# Patient Record
Sex: FEMALE | Race: WHITE | NOT HISPANIC OR LATINO | Employment: FULL TIME | URBAN - METROPOLITAN AREA
[De-identification: names, ages, dates, MRNs, and addresses within clinical notes are randomized per-mention and may not be internally consistent; named-entity substitution may affect disease eponyms.]

---

## 2023-09-05 ENCOUNTER — APPOINTMENT (OUTPATIENT)
Dept: RADIOLOGY | Facility: CLINIC | Age: 28
End: 2023-09-05
Payer: OTHER GOVERNMENT

## 2023-09-05 DIAGNOSIS — M25.551 RIGHT HIP PAIN: ICD-10-CM

## 2023-09-05 PROCEDURE — 73502 X-RAY EXAM HIP UNI 2-3 VIEWS: CPT

## 2023-09-18 ENCOUNTER — TELEPHONE (OUTPATIENT)
Age: 28
End: 2023-09-18

## 2023-09-18 NOTE — TELEPHONE ENCOUNTER
Caller: patient     Doctor: Kd Edwards    Reason for call: office must contact [facility] for copy of disk/imaging. Appt is scheduled 9/19.     Patient didn't have the ph #     Call back#: [phone]

## 2023-09-19 NOTE — TELEPHONE ENCOUNTER
Caller: Self    Doctor: Gildardo Sauer    Reason for call: Patient was notified by Lurdes Williamson that the office must request imaging from the [employer]. Is it beneficial for patient to be seen today without the imaging or should she wait until office requests them?     Confirmed with office, she should still come in to see provider and to sign medical release form-patient aware    Call back#: [phone]

## 2023-10-10 ENCOUNTER — TELEPHONE (OUTPATIENT)
Age: 28
End: 2023-10-10

## 2023-10-10 NOTE — TELEPHONE ENCOUNTER
Caller: Patient    Doctor: Edgar Zapata    Reason for call: Patient asked if medical request form was sent to [facility] to get MRI from [facility]?   Please contact to update    Call back#: [phone]

## 2023-10-11 NOTE — TELEPHONE ENCOUNTER
Caller: Self    Doctor: Al Daigle    Reason for call: Patient will be in tomorrow to complete medical record release form    Call back#: [phone]

## 2023-10-12 ENCOUNTER — TELEPHONE (OUTPATIENT)
Age: 28
End: 2023-10-12

## 2023-10-12 NOTE — TELEPHONE ENCOUNTER
Patient came in and signed a new Medical Information Release Form which was faxed to the number provided by the patient.

## 2023-10-12 NOTE — TELEPHONE ENCOUNTER
Caller: Farhana levi [facility]    Doctor: Dr. Fuller Handler    Reason for call: Unable to send medical records electronically, she will fax to  [phone] and mail to [place] office   Call back#: n/a

## 2023-10-23 ENCOUNTER — TELEPHONE (OUTPATIENT)
Dept: OBGYN CLINIC | Facility: HOSPITAL | Age: 28
End: 2023-10-23

## 2023-10-23 NOTE — TELEPHONE ENCOUNTER
Caller: Kaiden Preston     Doctor: Tiffanie Banda / Gato     Reason for call: Patient called in after receiving message regarding MRI and imaging results. Patient was told the imaging was in the chart when she made the appointment and noted in her appointment line. Checking media tab. First scanned item on 10/12 / MRI R hip completed on 7/11/2023. The following additional scanned items are xray.      Informed patient her MRI is in the system for appointment tomorrow with Dr. Tiffanie Banda       Call back#:

## 2023-10-23 NOTE — TELEPHONE ENCOUNTER
Called patient to let her know that MRI images where not received at office. Only MRI report is scanned in chart. LM on  for patient to C/B to r/s appt on 10/24. MRI images must be available to be seen at office.

## 2023-10-24 ENCOUNTER — TELEPHONE (OUTPATIENT)
Dept: OBGYN CLINIC | Facility: CLINIC | Age: 28
End: 2023-10-24

## 2023-10-24 NOTE — TELEPHONE ENCOUNTER
Pt came in and was told she could not be seen per Dr. Duncan Brody request due to having no MRI Images and only the report. Phone room scheduled pt again after MA called and LM telling pt she would not be seen without images.

## 2023-11-17 ENCOUNTER — TELEPHONE (OUTPATIENT)
Age: 28
End: 2023-11-17

## 2023-11-17 NOTE — TELEPHONE ENCOUNTER
Caller: patient    Doctor: Vipul Ch     Reason for call: Insurance is supposed to fax over a referral and active insurance information for patient. She was checking to see if it was received yet. Nothing in chart, confirmed correct fax number. Patient will call them back and ask them to refax.      Call back#: [phone]

## 2023-11-20 NOTE — TELEPHONE ENCOUNTER
Caller: Patient     Doctor: Cathryne Curling    Reason for call: Patient is calling to ask if the referral was received. I did not see it in her chart. She will call insurance company.

## 2023-11-30 ENCOUNTER — TELEPHONE (OUTPATIENT)
Age: 28
End: 2023-11-30

## 2023-11-30 NOTE — TELEPHONE ENCOUNTER
Call to this patient regarding Dr POLO [facility] retiring at end of March 2024 she understands and is ok with still seeing him, as she is [address] and may only be in the area for a short time.

## 2023-11-30 NOTE — TELEPHONE ENCOUNTER
Caller: Patient    Doctor: Shorty Zurita    Reason for call: Patient looking to establish care with Dr Shorty Zurita. Patient requested a referral for her insurance be faxed to the [facility] office yesterday. If possible, please alert patient if it was received, as patient is waiting for confirmation to schedule.     Call back#: [phone]

## 2023-12-06 NOTE — TELEPHONE ENCOUNTER
Caller: Patient    Doctor: Zackary Hamilton    Reason for call:     Patient is calling about the referral from [facility], she is looking to schedule an appointment. Please give her a call to advise. .    Call back#: [phone]

## 2023-12-20 ENCOUNTER — APPOINTMENT (OUTPATIENT)
Dept: RADIOLOGY | Facility: CLINIC | Age: 28
End: 2023-12-20
Payer: OTHER GOVERNMENT

## 2023-12-20 ENCOUNTER — OFFICE VISIT (OUTPATIENT)
Dept: OBGYN CLINIC | Facility: CLINIC | Age: 28
End: 2023-12-20
Payer: OTHER GOVERNMENT

## 2023-12-20 VITALS
WEIGHT: 142 LBS | SYSTOLIC BLOOD PRESSURE: 118 MMHG | HEART RATE: 78 BPM | OXYGEN SATURATION: 98 % | RESPIRATION RATE: 18 BRPM | HEIGHT: 69 IN | BODY MASS INDEX: 21.03 KG/M2 | DIASTOLIC BLOOD PRESSURE: 76 MMHG

## 2023-12-20 DIAGNOSIS — M84.351A STRESS FRACTURE OF RIGHT HIP: ICD-10-CM

## 2023-12-20 DIAGNOSIS — M25.551 RIGHT HIP PAIN: Primary | ICD-10-CM

## 2023-12-20 DIAGNOSIS — M25.551 RIGHT HIP PAIN: ICD-10-CM

## 2023-12-20 PROCEDURE — 99204 OFFICE O/P NEW MOD 45 MIN: CPT | Performed by: ORTHOPAEDIC SURGERY

## 2023-12-20 PROCEDURE — 73502 X-RAY EXAM HIP UNI 2-3 VIEWS: CPT

## 2023-12-20 NOTE — LETTER
December 20, 2023     Patient: Jacque Jiménez  YOB: 1995  Date of Visit: 12/20/2023      To Whom it May Concern:    Jacque Jiménez is under my professional care. Jacque was seen in my office on 12/20/2023. Jacque may return to full weight bearing as tolerated, without crutches. She may do light physical activity, and upper body strength training as tolerated.    If you have any questions or concerns, please don't hesitate to call.         Sincerely,          Latasha Chang MD        CC: No Recipients

## 2023-12-20 NOTE — PROGRESS NOTES
"HPI:  Patient is a 28 y.o. year old  female  who presents with chief complaint of right hip pain. 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 She doesn't remember what happened. It started as mild pain, but she toughed it out until graduation in late June. She noticed the pain began in late May. Her leader said she looked \"unstable\" while running and she was sent to [facility]. She says she had a stress reaction/fracture in her femur and sacrum. 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 She is not having pain today. She notes occasional mild pain. She says according to the army she is supposed to be on crutches and she is not allowed to go to the gym. It has been 7 months and her symptoms are currently minimal.      ROS:   General: No fever, no chills, no weight loss, no weight gain  HEENT:  No loss of hearing, no nose bleeds, no sore throat  Eyes:  No eye pain, no red eyes, no visual disturbance  Respiratory:  No cough, no shortness of breath, no wheezing  Cardiovascular:  No chest pain, no palpitations, no edema  GI: No abdominal pain, no nausea, no vomiting  Endocrine: No frequent urination, no excessive thirst  Urinary:  No dysuria, no hematuria, no incontinence  Musculoskeletal: see HPI and PE  Skin:  No rash, no wounds  Neurological:  No dizziness, no headache, no numbness  Psychiatric:  No difficulty concentrating, no depression, no suicide thoughts, no anxiety  Review of all other systems is negative    PMH:  History reviewed. No pertinent past medical history.    PSH:  History reviewed. No pertinent surgical history.    Medications:  No current outpatient medications on file.     No current facility-administered medications for this visit.       Allergies:  No Known Allergies    Family History:  Family History   Problem Relation Age of Onset    No Known Problems Mother     No Known Problems Father        Social History:  Social History     Occupational " "History    Not on file   Tobacco Use    Smoking status: Some Days     Types: Cigarettes    Smokeless tobacco: Never   Vaping Use    Vaping status: Never Used   Substance and Sexual Activity    Alcohol use: Yes     Comment: socially    Drug use: Not Currently    Sexual activity: Not on file       Physical Exam:  General :  Alert, cooperative, no distress, appears stated age  Blood pressure 118/76, pulse 78, resp. rate 18, height 5' 9\" (1.753 m), weight 64.4 kg (142 lb), SpO2 98%.   Head:  Normocephalic, without obvious abnormality, atraumatic   Eyes:  Conjunctiva/corneas clear, EOM's intact,   Ears:  Both ears normal appearance, no hearing deficits.    Nose: Nares normal, septum midline, no drainage    Neck: Supple,  trachea midline, no adenopathy, no tenderness, no mass   Back:   Symmetric, no curvature, ROM normal, no tenderness   Lungs:   Respirations unlabored   Chest Wall:  No tenderness or deformity   Extremities: Extremities normal, atraumatic, no cyanosis or edema      Pulses: 2+ and symmetric   Skin: Skin color, texture, turgor normal, no rashes or lesions      Neurologic: Normal           Ortho Exam  Right Hip:  Range of motion 120 degrees hip flexion  5/5 hip flexion  5/5 abduction  5/5 adduction  No tenderness to palpation over hip  able to perform straight leg raise  negative log roll  negative RUSTY, FADIR  Sensation intact to L2-S1 dermatomes   Straight leg raise negative  Extremity warm and well perfused     Lumbar spine and Sacrum  There is no midline tenderness present.    There is no paraspinal tenderness.    Sensation intact to light touch left L2 through S1 dermatomes.   Sensation intact to light touch right L2 through S1 dermatomes   Left Motor: 5/5 iliopsoas, 5/5 quadriceps, 5/5 tibialis anterior, 5/5 extensor hallucis longus, and 5/5 gastrocsoleus.   Right Motor: 5/5 iliopsoas, 5/5 quadriceps, 5/5 tibialis anterior, 5/5 extensor hallucis longus, and 5/5 gastrocsoleus.   Negative clonus " bilaterally.    Negative Babinski bilaterally  Pelvic AP compression negative  Lateral SI compression positive for slight discomfort  Straight leg raise test is negative Bilateral   The patient is well perfused distally.    Imaging Studies:     The following imaging studies were reviewed in office today. My findings are noted.    X-rays of the right hip obtained 12/20/2023 demonstrates no acute fracture or dislocation. Normal.    Assessment  Encounter Diagnoses   Name Primary?    Right hip pain Yes    History of stress fracture/reaction of right hip/sacral ala          Plan:  28 y.o female with resolving right hip stress fracture  X-rays taken and reviewed in the office today  Patient is doing well today  She may weight bear as tolerated without crutches.   Light exercise and upper extremity strength training as tolerated  A script was provided to begin physical therapy  She will follow up in 3 weeks for reevaluation.    Scribe Attestation      I,:  Gali Cadet am acting as a scribe while in the presence of the attending physician.:       I,:  Latasha Chang MD personally performed the services described in this documentation    as scribed in my presence.:

## 2024-01-03 ENCOUNTER — TELEPHONE (OUTPATIENT)
Dept: OBGYN CLINIC | Facility: HOSPITAL | Age: 29
End: 2024-01-03

## 2024-01-03 NOTE — TELEPHONE ENCOUNTER
Caller: Patient    Doctor: Charlene    Reason for call: Patient calling stating that  needs the Rx for PT.  This needs to be submitted online.  They don't accept faxes.  [email or web address]  Submit under the [employer] portal.    Call back#: [phone]

## 2024-01-08 NOTE — TELEPHONE ENCOUNTER
Call to Clarissa  @  [phone] after trying to up load PT orders with out success several times. Spoke with [facility] for 15 PT visits thru 05/01/2024 Auth # [id number].  Call to this patient to make her aware if location on Auth is not correct she can call and they will change it.

## 2024-01-09 ENCOUNTER — TELEPHONE (OUTPATIENT)
Dept: OBGYN CLINIC | Facility: CLINIC | Age: 29
End: 2024-01-09

## 2024-01-09 NOTE — TELEPHONE ENCOUNTER
Called patient to confirm appointment for tomorrow.  She stated that she finally got her insurance straightened out with Physical therapy and has her first appointment on Thursday.  Do you want to still see her tomorrow or would you like to see her after PT.  If you want to also see her after PT how many should she do before coming in

## 2024-01-10 NOTE — TELEPHONE ENCOUNTER
Dr Chang would like to see patient in a month.  Called patient and left a message notifying to schedule in a month

## 2024-01-17 ENCOUNTER — TELEPHONE (OUTPATIENT)
Age: 29
End: 2024-01-17

## 2024-01-17 NOTE — TELEPHONE ENCOUNTER
Caller: Fang    Doctor/Office: Charlene/Shahid Beal    CB#: [phone]      What needs to be faxed: PT RX    ATTN to:     Fax#: [phone]      Documents were successfully e-faxed

## 2024-03-11 ENCOUNTER — TELEPHONE (OUTPATIENT)
Age: 29
End: 2024-03-11

## 2024-03-11 NOTE — TELEPHONE ENCOUNTER
Caller: No from [facility]    Doctor: Charlene     Reason for call: updated PT script needed,  they are only good for 30 days per No, last one dated 12/2023    Fax #  [phone]    Call back#: [phone]

## 2024-03-12 DIAGNOSIS — M25.551 RIGHT HIP PAIN: Primary | ICD-10-CM
